# Patient Record
Sex: FEMALE | Race: WHITE | NOT HISPANIC OR LATINO | Employment: FULL TIME | URBAN - METROPOLITAN AREA
[De-identification: names, ages, dates, MRNs, and addresses within clinical notes are randomized per-mention and may not be internally consistent; named-entity substitution may affect disease eponyms.]

---

## 2024-03-27 ENCOUNTER — ANCILLARY PROCEDURE (OUTPATIENT)
Dept: GENERAL RADIOLOGY | Facility: CLINIC | Age: 64
End: 2024-03-27
Attending: PODIATRIST
Payer: COMMERCIAL

## 2024-03-27 ENCOUNTER — OFFICE VISIT (OUTPATIENT)
Dept: PODIATRY | Facility: CLINIC | Age: 64
End: 2024-03-27
Payer: COMMERCIAL

## 2024-03-27 VITALS
SYSTOLIC BLOOD PRESSURE: 138 MMHG | HEIGHT: 61 IN | DIASTOLIC BLOOD PRESSURE: 78 MMHG | OXYGEN SATURATION: 98 % | HEART RATE: 72 BPM

## 2024-03-27 DIAGNOSIS — M21.621 TAILOR'S BUNION OF RIGHT FOOT: ICD-10-CM

## 2024-03-27 DIAGNOSIS — M77.8 CAPSULITIS OF FOOT: ICD-10-CM

## 2024-03-27 DIAGNOSIS — M21.621 TAILOR'S BUNION OF RIGHT FOOT: Primary | ICD-10-CM

## 2024-03-27 PROCEDURE — 73630 X-RAY EXAM OF FOOT: CPT | Mod: RT | Performed by: RADIOLOGY

## 2024-03-27 PROCEDURE — 99204 OFFICE O/P NEW MOD 45 MIN: CPT | Performed by: PODIATRIST

## 2024-03-27 RX ORDER — SUMATRIPTAN 25 MG/1
25 TABLET, FILM COATED ORAL
COMMUNITY

## 2024-03-27 NOTE — PATIENT INSTRUCTIONS
We wish you continued good healing. If you have any questions or concerns, please do not hesitate to contact us at  [phone]    [employer] (secure e-mail communication and access to your chart) to send a message or to make an appointment.    Please remember to call and schedule a follow up appointment if one was recommended at your earliest convenience.     PODIATRY CLINIC HOURS  TELEPHONE NUMBER    Dr. Colin MIRANDAPFROILAN FACFAS        Clinics:  [facility]   [place]  Tuesday 1PM-6PM  [place]  Wednesday 745AM-330PM  [place]  Monday 2nd,4th  830AM-4PM  Thursday/Friday 745AM-230PM     [place] APPOINTMENTS  [phone]    [place] APPOINTMENTS  [phone]          If you need a medication refill, please contact us you may need lab work and/or a follow up visit prior to your refill (i.e. Antifungal medications).  If MRI needed please call Imaging at [phone]   HOW DO I GET MY KNEE SCOOTER? Knee scooters can be picked up at ANY Medical Supply stores with your knee scooter Prescription.  OR  Bring your signed prescription to an [facility] Medical Equipment showroom.   Set up an appointment for your custom Orthotics. Call any Orthotics locations call [phone]

## 2024-03-27 NOTE — LETTER
3/27/2024         RE: Francisca Lopez  [address]  [facility] [facility] [address]        Dear Colleague,    Thank you for referring your patient, Francisca Lopez, to the [facility]. Please see a copy of my visit note below.    Subjective:    Pt is seen today as a new pt referral with the c/c of painful rightfoot.  This has been symptomatic for the past  6months.   Patient points to later fifth metatarsal head.    Describes this as a burning pain.  Aggravated by activity and releaved by rest.  Has tried  changing shoewear with some success.   She has history of right bunion surgery.  Also complains of pain in her forefoot.  Points to subsecond and third metatarsal heads.  Aggravated with activity and relieved by rest.  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  Denies bruising.  Denies numbness or swelling.  Patient active walking to and from school every day.    ROS:  A 10-point review of systems was performed and is positive for that noted in the HPI and as seen above.  All other areas are negative.          Allergies   Allergen Reactions     No Known Drug Allergy        Current Outpatient Medications   Medication Sig Dispense Refill     EXCEDRIN MIGRAINE 250-250-65 MG OR TABS 2 TABLETS EVERY 6 HOURS AS NEEDED 0 0     MAXALT-MLT 10 MG OR TBDP Denied--patient has changed clinics, needs to get from current provider 0 0     SUMAtriptan (IMITREX) 25 MG tablet Take 25 mg by mouth at onset of headache         Patient Active Problem List   Diagnosis     Migraine variant     Galactorrhea not associated with childbirth     External hemorrhoids     Acute gastritis     CARDIOVASCULAR SCREENING; LDL GOAL LESS THAN 160       Past Medical History:   Diagnosis Date     Acute infective polyneuritis (H)     french polio     External hemorrhoids without mention of complication      Headache(784.0)      Other malignant neoplasm of skin of trunk, except scrotum [id number]  "   multiple lesions on back, shoulders, leg all basal cell       Past Surgical History:   Procedure Laterality Date     COLONOSCOPY  07     HC CORRECT BUNION,SIMPLE      right       Family History   Problem Relation Age of Onset     Anesthesia Reaction Maternal Uncle         , on operating table appendix.  Though due to anesthetic.     Cancer Mother         breast, unsure if it was true cancer     Cancer Maternal Grandmother         breast at later age     Cardiovascular Maternal Grandfather         heart attack     Circulatory Father         has ataxia     Genetic Disorder Father         ataxia     Respiratory Father         emphazema along with ataxia     Cerebrovascular Disease Father         possible stroke       Social History     Tobacco Use     Smoking status: Never     Smokeless tobacco: Not on file     Tobacco comments:     No smokers in home   Substance Use Topics     Alcohol use: Yes     Comment: 4-5 herberth/diet /monthly         Exam:    Vitals: /78   Pulse 72   Ht 1.549 m (5' 1\")   SpO2 98%   BMI: There is no height or weight on file to calculate BMI.  Height: 5' 1\"    Constitutional/ general:  Pt is in no apparent distress, appears well-nourished.  Cooperative with history and physical exam.     Psych:  The patient answered questions appropriately.  Normal affect.  Seems to have reasonable expectations, in terms of treatment.     Eyes:  Visual scanning/ tracking without deficit.     Ears:  Response to auditory stimuli is normal.  negative hearing aid devices.  Auricles in proper alignment.     Lymphatic:  Popliteal lymph nodes not enlarged.    Lungs:  Non labored breathing, non labored speech. No cough.  No audible wheezing. Even, quiet breathing.       Vascular:  positive pedal pulses bilaterally for both the DP and PT arteries.  CFT < 3 sec.  negative ankle edema.  positive pedal hair growth.    Neuro:  Alert and oriented x 3. Coordinated gait.  Light touch sensation is " intact    Derm: Normal texture and turgor.  No erythema, ecchymosis, or cyanosis.      Musculoskeletal:     Patient is ambulatory without an assistive device or brace.  Normal arch with weightbearing on the left.  Slight pronation on right.  Right increased internal tibial torsion noted.  The right forefoot is wider..   MS 5/5 all compartments.  Normal ROM all forefoot and rearfoot joints.  No equinus. Patient has a right side Tailor's bunion with weightbearing.  Negative for lateral bursa formation.  No pain plantar or dorsal.  No callus formation.  Patient has atrophic fat pad.  Slight pain under second and third metatarsal heads.  Negative Lachman's test.  Negative Marisol's click.    Radiographic Exam:  X-Ray x-ray shows long second and third metatarsals.  Can see increased IM angle between fourth and fifth metatarsals and small bone prominence.    Recent bone density test showed osteopenia    A/P   Right increased pronation with increased internal tibial torsion  Right bunionette  Right forefoot capsulitis    X-rays taken today.  Discussed with patient her right foot is slightly different than her left.  She has mild pronation with right increased internal tibial torsion here.  Discussed how this will cause slight increase in forefoot abduction.  Her right forefoot is wider.  Discussed cause of subsecond and third capsulitis.  Recommended stiff shoes to keep this offloaded inside and outside and made suggestions.  Could also pad with metatarsal pad or Budin splint.  Discussed shoes for offloading fifth metatarsal head.  Patient would like to discuss surgery today.  Would need an osteotomy of the fifth metatarsal head.  Explained this is a very small bone with 2 little screws would have to be nonweightbearing for 6 weeks, walking boot for 3 weeks, and then at approximately 8 weeks she could start walking in his shoe.  Same-day surgery under MAC anesthesia.  Complications include infection numbness slow healing  bone continued pain.  She is interested in doing procedure this summer after school is out.  She will call when she would like to set this up.      Colin Baca DPM, FACFAS        Again, thank you for allowing me to participate in the care of your patient.        Sincerely,        Colin Baca DPM

## 2024-03-27 NOTE — PROGRESS NOTES
Subjective:    Pt is seen today as a new pt referral with the c/c of painful rightfoot.  This has been symptomatic for the past  6months.   Patient points to later fifth metatarsal head.    Describes this as a burning pain.  Aggravated by activity and releaved by rest.  Has tried  changing shoewear with some success.   She has history of right bunion surgery.  Also complains of pain in her forefoot.  Points to subsecond and third metatarsal heads.  Aggravated with activity and relieved by rest.  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  Denies bruising.  Denies numbness or swelling.  Patient active walking to and from school every day.    ROS:  A 10-point review of systems was performed and is positive for that noted in the HPI and as seen above.  All other areas are negative.          Allergies   Allergen Reactions    No Known Drug Allergy        Current Outpatient Medications   Medication Sig Dispense Refill    EXCEDRIN MIGRAINE 250-250-65 MG OR TABS 2 TABLETS EVERY 6 HOURS AS NEEDED 0 0    MAXALT-MLT 10 MG OR TBDP Denied--patient has changed clinics, needs to get from current provider 0 0    SUMAtriptan (IMITREX) 25 MG tablet Take 25 mg by mouth at onset of headache         Patient Active Problem List   Diagnosis    Migraine variant    Galactorrhea not associated with childbirth    External hemorrhoids    Acute gastritis    CARDIOVASCULAR SCREENING; LDL GOAL LESS THAN 160       Past Medical History:   Diagnosis Date    Acute infective polyneuritis (H)     french polio    External hemorrhoids without mention of complication     Headache(784.0)     Other malignant neoplasm of skin of trunk, except scrotum [id number]    multiple lesions on back, shoulders, leg all basal cell       Past Surgical History:   Procedure Laterality Date    COLONOSCOPY  07/25/07     CORRECT BUNION,SIMPLE  1999    right       Family History   Problem Relation Age of Onset    Anesthesia Reaction  "Maternal Uncle         , on operating table appendix.  Though due to anesthetic.    Cancer Mother         breast, unsure if it was true cancer    Cancer Maternal Grandmother         breast at later age    Cardiovascular Maternal Grandfather         heart attack    Circulatory Father         has ataxia    Genetic Disorder Father         ataxia    Respiratory Father         emphazema along with ataxia    Cerebrovascular Disease Father         possible stroke       Social History     Tobacco Use    Smoking status: Never    Smokeless tobacco: Not on file    Tobacco comments:     No smokers in home   Substance Use Topics    Alcohol use: Yes     Comment: 4-5 herberth/diet /monthly         Exam:    Vitals: /78   Pulse 72   Ht 1.549 m (5' 1\")   SpO2 98%   BMI: There is no height or weight on file to calculate BMI.  Height: 5' 1\"    Constitutional/ general:  Pt is in no apparent distress, appears well-nourished.  Cooperative with history and physical exam.     Psych:  The patient answered questions appropriately.  Normal affect.  Seems to have reasonable expectations, in terms of treatment.     Eyes:  Visual scanning/ tracking without deficit.     Ears:  Response to auditory stimuli is normal.  negative hearing aid devices.  Auricles in proper alignment.     Lymphatic:  Popliteal lymph nodes not enlarged.    Lungs:  Non labored breathing, non labored speech. No cough.  No audible wheezing. Even, quiet breathing.       Vascular:  positive pedal pulses bilaterally for both the DP and PT arteries.  CFT < 3 sec.  negative ankle edema.  positive pedal hair growth.    Neuro:  Alert and oriented x 3. Coordinated gait.  Light touch sensation is intact    Derm: Normal texture and turgor.  No erythema, ecchymosis, or cyanosis.      Musculoskeletal:     Patient is ambulatory without an assistive device or brace.  Normal arch with weightbearing on the left.  Slight pronation on right.  Right increased internal tibial torsion " noted.  The right forefoot is wider..   MS 5/5 all compartments.  Normal ROM all forefoot and rearfoot joints.  No equinus. Patient has a right side Tailor's bunion with weightbearing.  Negative for lateral bursa formation.  No pain plantar or dorsal.  No callus formation.  Patient has atrophic fat pad.  Slight pain under second and third metatarsal heads.  Negative Lachman's test.  Negative Marisol's click.    Radiographic Exam:  X-Ray x-ray shows long second and third metatarsals.  Can see increased IM angle between fourth and fifth metatarsals and small bone prominence.    Recent bone density test showed osteopenia    A/P   Right increased pronation with increased internal tibial torsion  Right bunionette  Right forefoot capsulitis    X-rays taken today.  Discussed with patient her right foot is slightly different than her left.  She has mild pronation with right increased internal tibial torsion here.  Discussed how this will cause slight increase in forefoot abduction.  Her right forefoot is wider.  Discussed cause of subsecond and third capsulitis.  Recommended stiff shoes to keep this offloaded inside and outside and made suggestions.  Could also pad with metatarsal pad or Budin splint.  Discussed shoes for offloading fifth metatarsal head.  Patient would like to discuss surgery today.  Would need an osteotomy of the fifth metatarsal head.  Explained this is a very small bone with 2 little screws would have to be nonweightbearing for 6 weeks, walking boot for 3 weeks, and then at approximately 8 weeks she could start walking in his shoe.  Same-day surgery under MAC anesthesia.  Complications include infection numbness slow healing bone continued pain.  She is interested in doing procedure this summer after school is out.  She will call when she would like to set this up.      Colin Baca, KIRILL, FACFAS

## 2024-05-06 ENCOUNTER — TELEPHONE (OUTPATIENT)
Dept: PODIATRY | Facility: CLINIC | Age: 64
End: 2024-05-06
Payer: COMMERCIAL

## 2024-05-06 NOTE — TELEPHONE ENCOUNTER
Reason for Call:  Other call back    Detailed comments: Patient calling to schedule bunion surgery orders are needed. Thank you     Phone Number Patient can be reached at: Home number on file [phone] (home)    Best Time: any    Can we leave a detailed message on this number? YES    Call taken on 5/6/2024 at 8:46 AM by Kinga Rojas

## 2024-05-07 ENCOUNTER — TELEPHONE (OUTPATIENT)
Dept: PODIATRY | Facility: CLINIC | Age: 64
End: 2024-05-07
Payer: COMMERCIAL

## 2024-05-07 NOTE — TELEPHONE ENCOUNTER
Colin Baca, DPM  Kinga Rojas1 hour ago (12:23 PM)       I paced order for  surgery.  They will be calling to set this up

## 2024-05-08 ENCOUNTER — MYC MEDICAL ADVICE (OUTPATIENT)
Dept: SURGERY | Facility: CLINIC | Age: 64
End: 2024-05-08

## 2024-05-08 DIAGNOSIS — M21.621 TAILOR'S BUNIONETTE, RIGHT: ICD-10-CM

## 2024-05-08 NOTE — TELEPHONE ENCOUNTER
Type of surgery: Right fifth metatarsal bunionette correction with bone cutting and screw fixation (Right)   Location of surgery: [facility]  Date and time of surgery: 6/18  Surgeon: Keven  Pre-Op Appt Date: she will sched with Mary   Post-Op Appt Date: 7/3   Packet sent out: Yes  Pre-cert/Authorization completed:no

## 2024-05-25 ENCOUNTER — HEALTH MAINTENANCE LETTER (OUTPATIENT)
Age: 64
End: 2024-05-25

## 2024-06-10 RX ORDER — METOPROLOL SUCCINATE 25 MG/1
25 TABLET, EXTENDED RELEASE ORAL DAILY
COMMUNITY

## 2024-06-10 RX ORDER — METOPROLOL TARTRATE 50 MG
TABLET ORAL
COMMUNITY
End: 2024-06-18

## 2024-06-10 NOTE — CONFIDENTIAL NOTE
Francisca has Crutches and a Aircast boot. She is looking into getting a Knee scooter.  I explained she will need a boot that will need to be adjusted .   Will get her a boot from the surgery center    Fawn Hansen CMA

## 2024-06-17 ENCOUNTER — ANESTHESIA EVENT (OUTPATIENT)
Dept: SURGERY | Facility: AMBULATORY SURGERY CENTER | Age: 64
End: 2024-06-17
Payer: COMMERCIAL

## 2024-06-18 ENCOUNTER — ANESTHESIA (OUTPATIENT)
Dept: SURGERY | Facility: AMBULATORY SURGERY CENTER | Age: 64
End: 2024-06-18
Payer: COMMERCIAL

## 2024-06-18 ENCOUNTER — ANCILLARY PROCEDURE (OUTPATIENT)
Dept: GENERAL RADIOLOGY | Facility: CLINIC | Age: 64
End: 2024-06-18
Attending: PODIATRIST
Payer: COMMERCIAL

## 2024-06-18 ENCOUNTER — HOSPITAL ENCOUNTER (OUTPATIENT)
Facility: AMBULATORY SURGERY CENTER | Age: 64
Discharge: HOME | End: 2024-06-18
Attending: PODIATRIST | Admitting: PODIATRIST
Payer: COMMERCIAL

## 2024-06-18 VITALS
RESPIRATION RATE: 18 BRPM | SYSTOLIC BLOOD PRESSURE: 128 MMHG | OXYGEN SATURATION: 100 % | DIASTOLIC BLOOD PRESSURE: 81 MMHG | HEART RATE: 60 BPM | WEIGHT: 121.03 LBS | TEMPERATURE: 97.9 F | BODY MASS INDEX: 22.87 KG/M2

## 2024-06-18 DIAGNOSIS — M79.609 PAIN IN LIMB: ICD-10-CM

## 2024-06-18 DIAGNOSIS — M21.621 TAILOR'S BUNION OF RIGHT FOOT: Primary | ICD-10-CM

## 2024-06-18 PROCEDURE — 28110 PART REMOVAL OF METATARSAL: CPT | Performed by: ANESTHESIOLOGY

## 2024-06-18 PROCEDURE — G8907 PT DOC NO EVENTS ON DISCHARG: HCPCS

## 2024-06-18 PROCEDURE — 28110 PART REMOVAL OF METATARSAL: CPT | Mod: RT | Performed by: PODIATRIST

## 2024-06-18 PROCEDURE — 28110 PART REMOVAL OF METATARSAL: CPT | Performed by: NURSE ANESTHETIST, CERTIFIED REGISTERED

## 2024-06-18 PROCEDURE — 28110 PART REMOVAL OF METATARSAL: CPT | Mod: T9

## 2024-06-18 PROCEDURE — G8916 PT W IV AB GIVEN ON TIME: HCPCS

## 2024-06-18 DEVICE — IMP SCR SYN CORTEX 2.0X12MM SELF TAP SS 201.812: Type: IMPLANTABLE DEVICE | Site: FOOT | Status: FUNCTIONAL

## 2024-06-18 DEVICE — IMP SCR SYN CORTEX 2.0X14MM SELF TAP SS 201.814: Type: IMPLANTABLE DEVICE | Site: FOOT | Status: FUNCTIONAL

## 2024-06-18 RX ORDER — ONDANSETRON 2 MG/ML
4 INJECTION INTRAMUSCULAR; INTRAVENOUS EVERY 30 MIN PRN
Status: DISCONTINUED | OUTPATIENT
Start: 2024-06-18 | End: 2024-06-19 | Stop reason: HOSPADM

## 2024-06-18 RX ORDER — OXYCODONE HYDROCHLORIDE 5 MG/1
5 TABLET ORAL
Status: DISCONTINUED | OUTPATIENT
Start: 2024-06-18 | End: 2024-06-19 | Stop reason: HOSPADM

## 2024-06-18 RX ORDER — LIDOCAINE 40 MG/G
CREAM TOPICAL
Status: DISCONTINUED | OUTPATIENT
Start: 2024-06-18 | End: 2024-06-19 | Stop reason: HOSPADM

## 2024-06-18 RX ORDER — BUPIVACAINE HYDROCHLORIDE 5 MG/ML
INJECTION, SOLUTION PERINEURAL PRN
Status: DISCONTINUED | OUTPATIENT
Start: 2024-06-18 | End: 2024-06-18 | Stop reason: HOSPADM

## 2024-06-18 RX ORDER — SODIUM CHLORIDE, SODIUM LACTATE, POTASSIUM CHLORIDE, CALCIUM CHLORIDE 600; 310; 30; 20 MG/100ML; MG/100ML; MG/100ML; MG/100ML
INJECTION, SOLUTION INTRAVENOUS CONTINUOUS PRN
Status: DISCONTINUED | OUTPATIENT
Start: 2024-06-18 | End: 2024-06-18

## 2024-06-18 RX ORDER — SODIUM CHLORIDE, SODIUM LACTATE, POTASSIUM CHLORIDE, CALCIUM CHLORIDE 600; 310; 30; 20 MG/100ML; MG/100ML; MG/100ML; MG/100ML
INJECTION, SOLUTION INTRAVENOUS CONTINUOUS
Status: DISCONTINUED | OUTPATIENT
Start: 2024-06-18 | End: 2024-06-19 | Stop reason: HOSPADM

## 2024-06-18 RX ORDER — PROPOFOL 10 MG/ML
INJECTION, EMULSION INTRAVENOUS CONTINUOUS PRN
Status: DISCONTINUED | OUTPATIENT
Start: 2024-06-18 | End: 2024-06-18

## 2024-06-18 RX ORDER — CEFAZOLIN SODIUM 2 G/50ML
2 SOLUTION INTRAVENOUS SEE ADMIN INSTRUCTIONS
Status: DISCONTINUED | OUTPATIENT
Start: 2024-06-18 | End: 2024-06-19 | Stop reason: HOSPADM

## 2024-06-18 RX ORDER — CEFAZOLIN SODIUM 2 G/50ML
2 SOLUTION INTRAVENOUS
Status: COMPLETED | OUTPATIENT
Start: 2024-06-18 | End: 2024-06-18

## 2024-06-18 RX ORDER — NALOXONE HYDROCHLORIDE 0.4 MG/ML
0.1 INJECTION, SOLUTION INTRAMUSCULAR; INTRAVENOUS; SUBCUTANEOUS
Status: DISCONTINUED | OUTPATIENT
Start: 2024-06-18 | End: 2024-06-19 | Stop reason: HOSPADM

## 2024-06-18 RX ORDER — OXYCODONE HYDROCHLORIDE 5 MG/1
10 TABLET ORAL
Status: DISCONTINUED | OUTPATIENT
Start: 2024-06-18 | End: 2024-06-19 | Stop reason: HOSPADM

## 2024-06-18 RX ORDER — PROPOFOL 10 MG/ML
INJECTION, EMULSION INTRAVENOUS PRN
Status: DISCONTINUED | OUTPATIENT
Start: 2024-06-18 | End: 2024-06-18

## 2024-06-18 RX ORDER — DEXAMETHASONE SODIUM PHOSPHATE 4 MG/ML
INJECTION, SOLUTION INTRA-ARTICULAR; INTRALESIONAL; INTRAMUSCULAR; INTRAVENOUS; SOFT TISSUE PRN
Status: DISCONTINUED | OUTPATIENT
Start: 2024-06-18 | End: 2024-06-18

## 2024-06-18 RX ORDER — GLYCOPYRROLATE 0.2 MG/ML
INJECTION, SOLUTION INTRAMUSCULAR; INTRAVENOUS PRN
Status: DISCONTINUED | OUTPATIENT
Start: 2024-06-18 | End: 2024-06-18

## 2024-06-18 RX ORDER — ONDANSETRON 4 MG/1
4 TABLET, ORALLY DISINTEGRATING ORAL EVERY 30 MIN PRN
Status: DISCONTINUED | OUTPATIENT
Start: 2024-06-18 | End: 2024-06-19 | Stop reason: HOSPADM

## 2024-06-18 RX ORDER — LIDOCAINE HYDROCHLORIDE 20 MG/ML
INJECTION, SOLUTION INFILTRATION; PERINEURAL PRN
Status: DISCONTINUED | OUTPATIENT
Start: 2024-06-18 | End: 2024-06-18

## 2024-06-18 RX ORDER — FENTANYL CITRATE 50 UG/ML
INJECTION, SOLUTION INTRAMUSCULAR; INTRAVENOUS PRN
Status: DISCONTINUED | OUTPATIENT
Start: 2024-06-18 | End: 2024-06-18

## 2024-06-18 RX ORDER — ACETAMINOPHEN 325 MG/1
975 TABLET ORAL ONCE
Status: COMPLETED | OUTPATIENT
Start: 2024-06-18 | End: 2024-06-18

## 2024-06-18 RX ORDER — HYDROXYZINE HYDROCHLORIDE 25 MG/1
25-50 TABLET, FILM COATED ORAL EVERY 4 HOURS PRN
Qty: 25 TABLET | Refills: 0 | Status: SHIPPED | OUTPATIENT
Start: 2024-06-18

## 2024-06-18 RX ORDER — DEXAMETHASONE SODIUM PHOSPHATE 4 MG/ML
4 INJECTION, SOLUTION INTRA-ARTICULAR; INTRALESIONAL; INTRAMUSCULAR; INTRAVENOUS; SOFT TISSUE
Status: DISCONTINUED | OUTPATIENT
Start: 2024-06-18 | End: 2024-06-19 | Stop reason: HOSPADM

## 2024-06-18 RX ORDER — ONDANSETRON 2 MG/ML
INJECTION INTRAMUSCULAR; INTRAVENOUS PRN
Status: DISCONTINUED | OUTPATIENT
Start: 2024-06-18 | End: 2024-06-18

## 2024-06-18 RX ORDER — HYDROCODONE BITARTRATE AND ACETAMINOPHEN 5; 325 MG/1; MG/1
1-2 TABLET ORAL EVERY 4 HOURS PRN
Qty: 25 TABLET | Refills: 0 | Status: SHIPPED | OUTPATIENT
Start: 2024-06-18

## 2024-06-18 RX ADMIN — SODIUM CHLORIDE, SODIUM LACTATE, POTASSIUM CHLORIDE, CALCIUM CHLORIDE: 600; 310; 30; 20 INJECTION, SOLUTION INTRAVENOUS at 07:40

## 2024-06-18 RX ADMIN — LIDOCAINE HYDROCHLORIDE 40 MG: 20 INJECTION, SOLUTION INFILTRATION; PERINEURAL at 08:07

## 2024-06-18 RX ADMIN — PROPOFOL 30 MG: 10 INJECTION, EMULSION INTRAVENOUS at 08:07

## 2024-06-18 RX ADMIN — ACETAMINOPHEN 975 MG: 325 TABLET ORAL at 07:48

## 2024-06-18 RX ADMIN — DEXAMETHASONE SODIUM PHOSPHATE 4 MG: 4 INJECTION, SOLUTION INTRA-ARTICULAR; INTRALESIONAL; INTRAMUSCULAR; INTRAVENOUS; SOFT TISSUE at 08:10

## 2024-06-18 RX ADMIN — CEFAZOLIN SODIUM 2 G: 2 SOLUTION INTRAVENOUS at 07:40

## 2024-06-18 RX ADMIN — GLYCOPYRROLATE 0.2 MG: 0.2 INJECTION, SOLUTION INTRAMUSCULAR; INTRAVENOUS at 08:42

## 2024-06-18 RX ADMIN — PROPOFOL 100 MCG/KG/MIN: 10 INJECTION, EMULSION INTRAVENOUS at 08:07

## 2024-06-18 RX ADMIN — FENTANYL CITRATE 25 MCG: 50 INJECTION, SOLUTION INTRAMUSCULAR; INTRAVENOUS at 08:07

## 2024-06-18 RX ADMIN — ONDANSETRON 4 MG: 2 INJECTION INTRAMUSCULAR; INTRAVENOUS at 08:10

## 2024-06-18 NOTE — ANESTHESIA POSTPROCEDURE EVALUATION
Patient: Francisca Lopez    Procedure: Procedure(s):  Right fifth metatarsal bunionette correction with bone cutting and screw fixation       Anesthesia Type:  MAC    Note:  Disposition: Outpatient   Postop Pain Control: Uneventful            Sign Out: Well controlled pain   PONV: No   Neuro/Psych: Uneventful            Sign Out: Acceptable/Baseline neuro status   Airway/Respiratory: Uneventful            Sign Out: Acceptable/Baseline resp. status   CV/Hemodynamics: Uneventful            Sign Out: Acceptable CV status; No obvious hypovolemia; No obvious fluid overload   Other NRE: NONE   DID A NON-ROUTINE EVENT OCCUR?            Last vitals:  Vitals Value Taken Time   /81 06/18/24 1017   Temp     Pulse 60 06/18/24 1017   Resp 18 06/18/24 1017   SpO2 100 % 06/18/24 1017       Electronically Signed By: Kemar Leyva MD  June 18, 2024  10:22 AM

## 2024-06-18 NOTE — ANESTHESIA PREPROCEDURE EVALUATION
Anesthesia Pre-Procedure Evaluation    Patient: Francisca Lopez   MRN: [id number] : 1960        Procedure : Procedure(s):  Right fifth metatarsal bunionette correction with bone cutting and screw fixation          Past Medical History:   Diagnosis Date    Acute infective polyneuritis (H24)     Sammarinese polio    External hemorrhoids without mention of complication     Headache(784.0)     Other malignant neoplasm of skin of trunk, except scrotum [id number]    multiple lesions on back, shoulders, leg all basal cell      Past Surgical History:   Procedure Laterality Date    COLONOSCOPY  07    HC CORRECT BUNION,SIMPLE      right      Allergies   Allergen Reactions    No Known Drug Allergy       Social History     Tobacco Use    Smoking status: Never    Smokeless tobacco: Never    Tobacco comments:     No smokers in home   Substance Use Topics    Alcohol use: Yes     Comment: 4-5 drinks /monthly      Wt Readings from Last 1 Encounters:   24 54.9 kg (121 lb 0.5 oz)        Anesthesia Evaluation            ROS/MED HX  ENT/Pulmonary:  - neg pulmonary ROS     Neurologic:  - neg neurologic ROS     Cardiovascular:  - neg cardiovascular ROS     METS/Exercise Tolerance: >4 METS    Hematologic:  - neg hematologic  ROS     Musculoskeletal:  - neg musculoskeletal ROS     GI/Hepatic:  - neg GI/hepatic ROS     Renal/Genitourinary:  - neg Renal ROS     Endo:  - neg endo ROS     Psychiatric/Substance Use:  - neg psychiatric ROS     Infectious Disease:  - neg infectious disease ROS     Malignancy:  - neg malignancy ROS     Other:  - neg other ROS          Physical Exam    Airway        Mallampati: II   TM distance: > 3 FB   Neck ROM: full     Respiratory Devices and Support         Dental       (+) Completely normal teeth      Cardiovascular          Rhythm and rate: regular     Pulmonary           breath sounds clear to auscultation           OUTSIDE LABS:  CBC:   Lab Results   Component Value Date    WBC 4.4 2007  "   HGB 12.5 07/20/2007    HGB 14.1 05/11/2007    HCT 42.2 05/11/2007     05/11/2007     BMP:   Lab Results   Component Value Date    GLC 95 06/24/2003     COAGS: No results found for: \"PTT\", \"INR\", \"FIBR\"  POC: No results found for: \"BGM\", \"HCG\", \"HCGS\"  HEPATIC:   Lab Results   Component Value Date    ALBUMIN 4.4 05/11/2007    PROTTOTAL 7.9 05/11/2007    ALT 28 05/11/2007    AST 29 05/11/2007    ALKPHOS 43 05/11/2007    BILITOTAL 0.4 05/11/2007     OTHER:   Lab Results   Component Value Date    LIPASE 120 05/11/2007    TSH 1.00 07/20/2007    T4 0.91 07/20/2007       Anesthesia Plan    ASA Status:  1       Anesthesia Type: MAC.     - Reason for MAC: immobility needed   Induction: Propofol.           Consents    Anesthesia Plan(s) and associated risks, benefits, and realistic alternatives discussed. Questions answered and patient/representative(s) expressed understanding.     - Discussed:     - Discussed with:  Patient            Postoperative Care    Pain management: Multi-modal analgesia.   PONV prophylaxis: Ondansetron (or other 5HT-3), Background Propofol Infusion     Comments:               Kemar Leyva MD    I have reviewed the pertinent notes and labs in the chart from the past 30 days and (re)examined the patient.  Any updates or changes from those notes are reflected in this note.                  "

## 2024-06-18 NOTE — BRIEF OP NOTE
[facility] Brief Operative Note    Pre-operative diagnosis: Tailor's bunion of right foot [M21.621]   Post-operative diagnosis same   Procedure: Procedure(s):  Right fifth metatarsal bunionette correction with bone cutting and screw fixation   Surgeon(s): Surgeons and Role:     * Colin Baca DPM - Primary   Estimated blood loss: 1 mL    Specimens: * No specimens in log *   Findings: none

## 2024-06-18 NOTE — DISCHARGE INSTRUCTIONS
You received 975 mg Tylenol at 7:45 am. May Repeat after 1:45 PM. Maximum daily dosage is 4000 mg.

## 2024-06-18 NOTE — OP NOTE
Surgeon:  Dr. Colin Baca    Date of Surgery: 6/18/24    Preopp diagnosis: Right bunionette    Postopp diagnosis: Same    Procedure: Right fifth metatarsal bunionette correction with bone cutting and screw fixation    Anesthesia: MAC    Hemostasis: Pneumatic ankle tourniquet at 250 mmHg    Indications: Patient has painful bunionette.  Would like to have correction of this.  She understands importance of nonweightbearing for 6 weeks afterwards as this is a small bone with little screws.  Other complications included numbness infection and pain.    Patient was brought to the operating table in a supine position.  Patient was given sedation by anesthesia and a right fifth Chris block was done at this time.  The foot was then prepped and draped in the normal sterile manner and a pneumatic ankle tourniquet was placed around the ankle with copious amounts of Webril padding.  The foot was then elevated for complete exsanguination the tourniquet was inflated and the foot was brought on the operating table where the following procedure was performed.    At this time an incision was made  right fifth MTPJ lateral to extensor tendonover.  This was brought down to the deep fascia utilizing blunt and sharp dissection techniques.  All neurovascular structures were encountered with the Bovied, tied, or retracted to the side.  At this time the periosteum was incised the entire length of the incision reflected off laterally and dorsally.  Medial we remove the medial bump with a sagittal saw.  A 0.062 K wire was used as an axis guide in neutral position.  We cut a standard long-arm Manny bunionectomy.  This was transposed lateral.  We temporarily fixated this.  Good reduction of the deformity was noted.  We then fixated this with a 2.0mm screw from dorsal to plantar utilizing standard AO fixation techniques x 2.  We sent off the lateral bump.  Also bony prominences were burred smooth.  We retightened the screws.  We loaded the  foot.  Good reduction of deformity was noted.  This was confirmed with the FluoroScan.    The wound was then flushed.  Standard layered closure was done at this time.  We dressed this with Adaptic 4 x 4's Theodore Kerlix and Coban.  Tourniquet was deflated all digits were noted to show proper levels of perfusion.  Plantarflexed cam walker was placed on her foot.  Complications were none.  Estimated blood loss was 1 cc.  Patient tolerated procedure and anesthesia well.  Was then wheeled from the operating to the cover room vital signs stable and intact sterile dressing.     Colin Baca DPM, FACFAS

## 2024-06-28 ENCOUNTER — TELEPHONE (OUTPATIENT)
Dept: PODIATRY | Facility: CLINIC | Age: 64
End: 2024-06-28
Payer: COMMERCIAL

## 2024-06-28 NOTE — TELEPHONE ENCOUNTER
Called patient:    Patient fell yesterday afternoon at 4pm and put pressure on her foot  No more pain with the fall  Kept boot on  Toes are more swollen   No burning or tingling  Has sensation in her feet  No redness or warmth to the touch  No change in the foot/ankle  No new lumps or bumps in foot.   Patient has follow up next Wednesday 7/3. Told patient to ice, rest and elevate until then and to call if any change in symptoms. Patient confirmed understanding.     Homa PATRICIO RN, [facility] 06/28/24 8:33 AM

## 2024-06-28 NOTE — TELEPHONE ENCOUNTER
Other: Patient is calling in as she had a fall and needed to put some weight on her foot. Patient states she isn't having any more pain than usual but there is more swelling. Please call patient.      Could we send this information to you in YingYang or would you prefer to receive a phone call?:   Patient would prefer a phone call   Okay to leave a detailed message?: Yes at Home number on file [phone] (home)

## 2024-07-03 ENCOUNTER — OFFICE VISIT (OUTPATIENT)
Dept: PODIATRY | Facility: CLINIC | Age: 64
End: 2024-07-03
Payer: COMMERCIAL

## 2024-07-03 ENCOUNTER — ANCILLARY PROCEDURE (OUTPATIENT)
Dept: GENERAL RADIOLOGY | Facility: CLINIC | Age: 64
End: 2024-07-03
Attending: PODIATRIST
Payer: COMMERCIAL

## 2024-07-03 DIAGNOSIS — M21.621 TAILOR'S BUNION OF RIGHT FOOT: Primary | ICD-10-CM

## 2024-07-03 DIAGNOSIS — M21.621 TAILOR'S BUNION OF RIGHT FOOT: ICD-10-CM

## 2024-07-03 PROCEDURE — 73630 X-RAY EXAM OF FOOT: CPT | Mod: RT | Performed by: RADIOLOGY

## 2024-07-03 PROCEDURE — 99024 POSTOP FOLLOW-UP VISIT: CPT | Performed by: PODIATRIST

## 2024-07-03 NOTE — LETTER
7/3/2024      Francisca Lopez  [address]  [facility] [address]      Dear Colleague,    Thank you for referring your patient, Francisca Lopez, to the [facility]. Please see a copy of my visit note below.    Subjective:    Patient is 15 days postopp right fifth metatarsal Manny bunionectomy procedure done on 6/18/24.  Patient is doing well, admits compliance, denies fevers, chills, nausea or vomiting.  Pain slowly getting better.  Patient did trip and fall.  She had the front part of her toes.  Did not have pain at surgical site.  States that time she is putting weight on heel at home.  Otherwise she has been nonweightbearing.  Has no other new complaints.    Objective:    Dressings clean, dry and intact.  Incisions are dry, well coapted with no dehiscence or drainage with suture removal.  Pulses are palpable, sensation to light touch is intact.  Normal postopp edema.  No erythema or ecchymosis on the opperative site.  xrays show osteotomy tight, hardware in place with no sign of movement.  Hallux in good alignment.      Assessment: Postopp day 15 right fifth metatarsal Manny bunionectomy.    Plan: Sutures removed.  Dressed with gauze and Ace.  Xray today.  Discussed well reduced.  No change in osteotomy or hardware.  Continue nonweightbearing at all times for the next 4 weeks.  We gave the 2-week warning.  May do range of motion at ankle.  Continue to elevate and use compression.  Return to clinic in 4 weeks.    Colin Baca DPM, FACFAS        Again, thank you for allowing me to participate in the care of your patient.        Sincerely,        Colin Baca DPM

## 2024-07-03 NOTE — PROGRESS NOTES
Subjective:    Patient is 15 days postopp right fifth metatarsal Manny bunionectomy procedure done on 6/18/24.  Patient is doing well, admits compliance, denies fevers, chills, nausea or vomiting.  Pain slowly getting better.  Patient did trip and fall.  She had the front part of her toes.  Did not have pain at surgical site.  States that time she is putting weight on heel at home.  Otherwise she has been nonweightbearing.  Has no other new complaints.    Objective:    Dressings clean, dry and intact.  Incisions are dry, well coapted with no dehiscence or drainage with suture removal.  Pulses are palpable, sensation to light touch is intact.  Normal postopp edema.  No erythema or ecchymosis on the opperative site.  xrays show osteotomy tight, hardware in place with no sign of movement.  Hallux in good alignment.      Assessment: Postopp day 15 right fifth metatarsal Manny bunionectomy.    Plan: Sutures removed.  Dressed with gauze and Ace.  Xray today.  Discussed well reduced.  No change in osteotomy or hardware.  Continue nonweightbearing at all times for the next 4 weeks.  We gave the 2-week warning.  May do range of motion at ankle.  Continue to elevate and use compression.  Return to clinic in 4 weeks.    Colin Baca DPM, FACFAS

## 2024-07-03 NOTE — PATIENT INSTRUCTIONS
We wish you continued good healing. If you have any questions or concerns, please do not hesitate to contact us at  [phone]    [employer] (secure e-mail communication and access to your chart) to send a message or to make an appointment.    Please remember to call and schedule a follow up appointment if one was recommended at your earliest convenience.     PODIATRY CLINIC HOURS  TELEPHONE NUMBER    Dr. Colin MIRANDAPFROILAN FACFAS        Clinics:  [facility]   [place]  Tuesday 1PM-6PM  [place]  Wednesday 745AM-330PM  [place]  Monday 2nd,4th  830AM-4PM  Thursday/Friday 745AM-230PM     [place] APPOINTMENTS  [phone]    [place] APPOINTMENTS  [phone]          If you need a medication refill, please contact us you may need lab work and/or a follow up visit prior to your refill (i.e. Antifungal medications).  If MRI needed please call Imaging at [phone]   HOW DO I GET MY KNEE SCOOTER? Knee scooters can be picked up at ANY Medical Supply stores with your knee scooter Prescription.  OR  Bring your signed prescription to an [facility] showroom.   Set up an appointment for your custom Orthotics. Call any Orthotics locations call [phone]

## 2024-07-31 ENCOUNTER — ANCILLARY PROCEDURE (OUTPATIENT)
Dept: GENERAL RADIOLOGY | Facility: CLINIC | Age: 64
End: 2024-07-31
Attending: PODIATRIST
Payer: COMMERCIAL

## 2024-07-31 ENCOUNTER — OFFICE VISIT (OUTPATIENT)
Dept: PODIATRY | Facility: CLINIC | Age: 64
End: 2024-07-31
Payer: COMMERCIAL

## 2024-07-31 DIAGNOSIS — M21.621 TAILOR'S BUNION OF RIGHT FOOT: Primary | ICD-10-CM

## 2024-07-31 DIAGNOSIS — M21.621 TAILOR'S BUNION OF RIGHT FOOT: ICD-10-CM

## 2024-07-31 PROCEDURE — 99024 POSTOP FOLLOW-UP VISIT: CPT | Performed by: PODIATRIST

## 2024-07-31 PROCEDURE — 73630 X-RAY EXAM OF FOOT: CPT | Mod: RT | Performed by: RADIOLOGY

## 2024-07-31 NOTE — PATIENT INSTRUCTIONS
We wish you continued good healing. If you have any questions or concerns, please do not hesitate to contact us at  [phone]    [employer] (secure e-mail communication and access to your chart) to send a message or to make an appointment.    Please remember to call and schedule a follow up appointment if one was recommended at your earliest convenience.     PODIATRY CLINIC HOURS  TELEPHONE NUMBER    Dr. Colin MIRANDAPFROILAN FACFAS        Clinics:  [facility] [address]   [place]  Tuesday 1PM-6PM  [place]  Wednesday 745AM-330PM  [place]  Monday 2nd,4th  830AM-4PM  Thursday/Friday 745AM-230PM     [place] APPOINTMENTS  [phone]    [place] APPOINTMENTS  [phone]          If you need a medication refill, please contact us you may need lab work and/or a follow up visit prior to your refill (i.e. Antifungal medications).  If MRI needed please call Imaging at [phone]   HOW DO I GET MY KNEE SCOOTER? Knee scooters can be picked up at ANY Medical Supply stores with your knee scooter Prescription.  OR  Bring your signed prescription to an [facility] Equipment showroom.   Set up an appointment for your custom Orthotics. Call any Orthotics locations call [phone]

## 2024-07-31 NOTE — PROGRESS NOTES
Subjective:    Patient is 6 weeks 1 day postopp right fifth metatarsal Manny bunionectomy procedure done on 6/18/24.  Patient is doing well, admits compliance, denies fevers, chills, nausea or vomiting.  Virtually no pain now.  Edema decreasing.  Otherwise she has been nonweightbearing.  Has no other new complaints.    Objective:    Dressings clean, dry and intact.  Incision well-healed pulses are palpable, sensation to light touch is intact.  Normal postopp edema.  No erythema or ecchymosis on the opperative site.  xrays show osteotomy tight, hardware in place with no sign of movement.  Hallux in good alignment.      Assessment: Postopp right fifth metatarsal Manny bunionectomy.    Plan: X-rays taken today of right foot.  Discussed the osteotomy has not moved and the hardware is in place.  Appears to be healing.  Will have patient start walking in cam walker.  Current walking boot too large.  We gave her smaller 1.  Much more comfortable.  Walk 2 hours today.  If no pain or swelling will slowly increase 1 to 2 hours/day.  If has pain and swelling will go back to nonweightbearing for 3 days and try again.  Will slowly increase activities as tolerated.  Return to clinic in 3 weeks.  2 days before she returns to clinic we will start trying to walk in a good supportive shoe.    Colin Baca DPM, FACFAS

## 2024-07-31 NOTE — LETTER
7/31/2024      Francisca Lopez  [address]  [facility] [address]      Dear Colleague,    Thank you for referring your patient, Francisca Lopez, to the [facility]. Please see a copy of my visit note below.    Subjective:    Patient is 6 weeks 1 day postopp right fifth metatarsal Manny bunionectomy procedure done on 6/18/24.  Patient is doing well, admits compliance, denies fevers, chills, nausea or vomiting.  Virtually no pain now.  Edema decreasing.  Otherwise she has been nonweightbearing.  Has no other new complaints.    Objective:    Dressings clean, dry and intact.  Incision well-healed pulses are palpable, sensation to light touch is intact.  Normal postopp edema.  No erythema or ecchymosis on the opperative site.  xrays show osteotomy tight, hardware in place with no sign of movement.  Hallux in good alignment.      Assessment: Postopp right fifth metatarsal Manny bunionectomy.    Plan: X-rays taken today of right foot.  Discussed the osteotomy has not moved and the hardware is in place.  Appears to be healing.  Will have patient start walking in cam walker.  Current walking boot too large.  We gave her smaller 1.  Much more comfortable.  Walk 2 hours today.  If no pain or swelling will slowly increase 1 to 2 hours/day.  If has pain and swelling will go back to nonweightbearing for 3 days and try again.  Will slowly increase activities as tolerated.  Return to clinic in 3 weeks.  2 days before she returns to clinic we will start trying to walk in a good supportive shoe.    Colin Baca DPM, FACFAS        Again, thank you for allowing me to participate in the care of your patient.        Sincerely,        Colin aBca DPM

## 2024-08-12 ENCOUNTER — DOCUMENTATION ONLY (OUTPATIENT)
Dept: PODIATRY | Facility: CLINIC | Age: 64
End: 2024-08-12
Payer: COMMERCIAL

## 2024-08-12 DIAGNOSIS — M21.621 TAILOR'S BUNION OF RIGHT FOOT: Primary | ICD-10-CM

## 2024-08-21 ENCOUNTER — OFFICE VISIT (OUTPATIENT)
Dept: PODIATRY | Facility: CLINIC | Age: 64
End: 2024-08-21
Payer: COMMERCIAL

## 2024-08-21 ENCOUNTER — ANCILLARY PROCEDURE (OUTPATIENT)
Dept: GENERAL RADIOLOGY | Facility: CLINIC | Age: 64
End: 2024-08-21
Attending: PODIATRIST
Payer: COMMERCIAL

## 2024-08-21 DIAGNOSIS — M21.621 TAILOR'S BUNION OF RIGHT FOOT: ICD-10-CM

## 2024-08-21 DIAGNOSIS — M21.621 TAILOR'S BUNION OF RIGHT FOOT: Primary | ICD-10-CM

## 2024-08-21 PROBLEM — I77.810 ACQUIRED DILATION OF ASCENDING AORTA AND AORTIC ROOT (H): Status: ACTIVE | Noted: 2023-02-03

## 2024-08-21 PROBLEM — Q23.1 AORTIC INSUFFICIENCY DUE TO BICUSPID AORTIC VALVE: Status: ACTIVE | Noted: 2023-02-03

## 2024-08-21 PROBLEM — E78.89 ELEVATED HDL: Status: ACTIVE | Noted: 2017-07-29

## 2024-08-21 PROBLEM — Q23.81 AORTIC INSUFFICIENCY DUE TO BICUSPID AORTIC VALVE: Status: ACTIVE | Noted: 2023-02-03

## 2024-08-21 PROBLEM — B35.1 ONYCHOMYCOSIS: Status: ACTIVE | Noted: 2022-12-26

## 2024-08-21 PROBLEM — L91.0 KELOID SCAR: Status: ACTIVE | Noted: 2024-08-21

## 2024-08-21 PROBLEM — C43.62 MELANOMA OF LEFT UPPER ARM (H): Status: ACTIVE | Noted: 2018-05-01

## 2024-08-21 PROCEDURE — 73630 X-RAY EXAM OF FOOT: CPT | Mod: RT | Performed by: RADIOLOGY

## 2024-08-21 PROCEDURE — 99024 POSTOP FOLLOW-UP VISIT: CPT | Performed by: PODIATRIST

## 2024-08-21 NOTE — PATIENT INSTRUCTIONS
We wish you continued good healing. If you have any questions or concerns, please do not hesitate to contact us at  [phone]    [employer] (secure e-mail communication and access to your chart) to send a message or to make an appointment.    Please remember to call and schedule a follow up appointment if one was recommended at your earliest convenience.     PODIATRY CLINIC HOURS  TELEPHONE NUMBER    Dr. Colin MIRANDAPFROILAN FACFAS        Clinics:  [facility]   [place]  Tuesday 1PM-6PM  [place]  Wednesday 745AM-330PM  [place]  Monday 2nd,4th  830AM-4PM  Thursday/Friday 745AM-230PM     [place] APPOINTMENTS  [phone]    [place] APPOINTMENTS  [phone]          If you need a medication refill, please contact us you may need lab work and/or a follow up visit prior to your refill (i.e. Antifungal medications).  If MRI needed please call Imaging at [phone]   HOW DO I GET MY KNEE SCOOTER? Knee scooters can be picked up at ANY Medical Supply stores with your knee scooter Prescription.  OR  Bring your signed prescription to an [facility] Medical Equipment showroom.   Set up an appointment for your custom Orthotics. Call any Orthotics locations call [phone]

## 2024-08-21 NOTE — PROGRESS NOTES
Subjective:    Patient is approximately 2 months postopp right fifth metatarsal Manny bunionectomy procedure done on 6/18/24.  Patient is doing well, admits compliance, denies fevers, chills, nausea or vomiting.  Walking in his shoe now.  Uses compression on a what helps.  States generally feeling much better and discomfort decreasing.  Some days already walking 4000 steps a day perhaps more.    Objective:     Incision well-healed pulses are palpable, sensation to light touch is intact.  Normal postopp edema and minimal now.  No erythema or ecchymosis on the opperative site.  xrays show osteotomy tight, hardware in place with no sign of movement.  Deformity well reduced.    Assessment: Postopp right fifth metatarsal Manny bunionectomy.    Plan: X-rays taken today of right foot.  Discussed the osteotomy has not moved and the hardware is in place.  Appears to be healing both radiographically and clinically.  Continue to slowly walk more and more.  Will be careful not to overdo it.  Discussed at 3 months after surgery can slowly start higher impact activities.  Discussed total healing may take up to a year.  Now she needs tincture of time.  Return to clinic if has any questions otherwise as needed.    Colni Baca, KIRILL, FACFAS

## 2024-08-21 NOTE — LETTER
8/21/2024      Francisca Lopez  [address]  [facility] [address]      Dear Colleague,    Thank you for referring your patient, Francisca Lopez, to the [facility]. Please see a copy of my visit note below.    Subjective:    Patient is approximately 2 months postopp right fifth metatarsal Manny bunionectomy procedure done on 6/18/24.  Patient is doing well, admits compliance, denies fevers, chills, nausea or vomiting.  Walking in his shoe now.  Uses compression on a what helps.  States generally feeling much better and discomfort decreasing.  Some days already walking 4000 steps a day perhaps more.    Objective:     Incision well-healed pulses are palpable, sensation to light touch is intact.  Normal postopp edema and minimal now.  No erythema or ecchymosis on the opperative site.  xrays show osteotomy tight, hardware in place with no sign of movement.  Deformity well reduced.    Assessment: Postopp right fifth metatarsal Manny bunionectomy.    Plan: X-rays taken today of right foot.  Discussed the osteotomy has not moved and the hardware is in place.  Appears to be healing both radiographically and clinically.  Continue to slowly walk more and more.  Will be careful not to overdo it.  Discussed at 3 months after surgery can slowly start higher impact activities.  Discussed total healing may take up to a year.  Now she needs tincture of time.  Return to clinic if has any questions otherwise as needed.    Colin Baca DPM, FACFAS        Again, thank you for allowing me to participate in the care of your patient.        Sincerely,        Colin Baca DPM

## 2025-06-14 ENCOUNTER — HEALTH MAINTENANCE LETTER (OUTPATIENT)
Age: 65
End: 2025-06-14

## (undated) DEVICE — PREP CHLORAPREP 26ML TINTED ORANGE  260815

## (undated) DEVICE — GLOVE BIOGEL PI MICRO SZ 8.5 48585

## (undated) DEVICE — BNDG KLING 3" 2232

## (undated) DEVICE — NDL 25GA 1.5" 305127

## (undated) DEVICE — DRAPE C-ARM MINI 5423

## (undated) DEVICE — SU VICRYL 4-0 PS-2 18" UND J496H

## (undated) DEVICE — DRSG GAUZE 4X4" TRAY 6939

## (undated) DEVICE — PACK EXTREMITY SOP15EXFSD

## (undated) DEVICE — SOL WATER IRRIG 1000ML BOTTLE 07139-09

## (undated) DEVICE — DRAPE STERI TOWEL SM 1000

## (undated) DEVICE — PIN GUARD 10-1-001 101001PBX

## (undated) DEVICE — SU ETHILON 4-0 FS-2 18" 662H

## (undated) DEVICE — SU VICRYL 3-0 RB-1 27" UND J215H

## (undated) DEVICE — BLADE SAW OSCILLATING STRYK MED 9.0X25X0.38MM 2296-003-111

## (undated) DEVICE — DRSG KERLIX 4 1/2"X4YDS ROLL 6715

## (undated) DEVICE — GOWN IMPERVIOUS BREATHABLE 2XL/XLONG

## (undated) DEVICE — SYR 10ML LL W/O NDL

## (undated) DEVICE — NDL 19GA 1.5"

## (undated) DEVICE — DRSG STERI STRIP 1/4X3" R1541

## (undated) DEVICE — ESU GROUND PAD ADULT W/CORD E7507

## (undated) DEVICE — Device

## (undated) RX ORDER — CEFAZOLIN SODIUM 2 G/50ML
SOLUTION INTRAVENOUS
Status: DISPENSED
Start: 2024-06-18

## (undated) RX ORDER — DEXAMETHASONE SODIUM PHOSPHATE 4 MG/ML
INJECTION, SOLUTION INTRA-ARTICULAR; INTRALESIONAL; INTRAMUSCULAR; INTRAVENOUS; SOFT TISSUE
Status: DISPENSED
Start: 2024-06-18

## (undated) RX ORDER — ACETAMINOPHEN 325 MG/1
TABLET ORAL
Status: DISPENSED
Start: 2024-06-18

## (undated) RX ORDER — GLYCOPYRROLATE 0.2 MG/ML
INJECTION, SOLUTION INTRAMUSCULAR; INTRAVENOUS
Status: DISPENSED
Start: 2024-06-18

## (undated) RX ORDER — ONDANSETRON 2 MG/ML
INJECTION INTRAMUSCULAR; INTRAVENOUS
Status: DISPENSED
Start: 2024-06-18

## (undated) RX ORDER — FENTANYL CITRATE 50 UG/ML
INJECTION, SOLUTION INTRAMUSCULAR; INTRAVENOUS
Status: DISPENSED
Start: 2024-06-18

## (undated) RX ORDER — BUPIVACAINE HYDROCHLORIDE 5 MG/ML
INJECTION, SOLUTION PERINEURAL
Status: DISPENSED
Start: 2024-06-18

## (undated) RX ORDER — PROPOFOL 10 MG/ML
INJECTION, EMULSION INTRAVENOUS
Status: DISPENSED
Start: 2024-06-18